# Patient Record
Sex: MALE | Race: WHITE
[De-identification: names, ages, dates, MRNs, and addresses within clinical notes are randomized per-mention and may not be internally consistent; named-entity substitution may affect disease eponyms.]

---

## 2019-02-28 ENCOUNTER — HOSPITAL ENCOUNTER (EMERGENCY)
Dept: HOSPITAL 26 - MED | Age: 19
Discharge: HOME | End: 2019-02-28
Payer: COMMERCIAL

## 2019-02-28 VITALS — WEIGHT: 160 LBS | BODY MASS INDEX: 22.4 KG/M2 | HEIGHT: 71 IN

## 2019-02-28 VITALS — DIASTOLIC BLOOD PRESSURE: 72 MMHG | SYSTOLIC BLOOD PRESSURE: 128 MMHG
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
## 2019-02-28 DIAGNOSIS — S39.012A: Primary | ICD-10-CM
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
## 2019-02-28 PROCEDURE — 99283 EMERGENCY DEPT VISIT LOW MDM: CPT

## 2019-02-28 PROCEDURE — 96372 THER/PROPH/DIAG INJ SC/IM: CPT

## 2019-02-28 NOTE — NUR
PT BIB SELF C/O 9/10 L LOWER BACK PULLING PAIN S/P PICKING UP LITTLE BROTHER 
YESTERDAY. NO DEFORMITY OR DISCOLORATION. STATES THE PAIN WAS SO BAD WHEN IT 
FIRST HAPPENED THAT HE COULD NOT WALK. AMB TO BED WITH STEADY GAIT. NO OTHER 
SYMPTOMS MADE KNOWN AT THIS TIME. PATIENT POSITIONED FOR COMFORT. BED IN LOW 
LOCKED POSITION.